# Patient Record
Sex: FEMALE | Race: WHITE | NOT HISPANIC OR LATINO | URBAN - METROPOLITAN AREA
[De-identification: names, ages, dates, MRNs, and addresses within clinical notes are randomized per-mention and may not be internally consistent; named-entity substitution may affect disease eponyms.]

---

## 2019-10-29 ENCOUNTER — OFFICE (OUTPATIENT)
Dept: URBAN - METROPOLITAN AREA CLINIC 78 | Facility: CLINIC | Age: 49
End: 2019-10-29

## 2019-10-29 VITALS
TEMPERATURE: 97.7 F | HEIGHT: 63 IN | DIASTOLIC BLOOD PRESSURE: 83 MMHG | SYSTOLIC BLOOD PRESSURE: 116 MMHG | WEIGHT: 252 LBS | HEART RATE: 74 BPM

## 2019-10-29 DIAGNOSIS — R13.10 DYSPHAGIA, UNSPECIFIED: ICD-10-CM

## 2019-10-29 DIAGNOSIS — R12 HEARTBURN: ICD-10-CM

## 2019-10-29 PROCEDURE — 99244 OFF/OP CNSLTJ NEW/EST MOD 40: CPT

## 2019-10-29 NOTE — SERVICEHPINOTES
HENRY AC   is a   49  female who presents with dysphagia. Has been experiencing food getting stuck at upper esophagus over 6 months. Reports a hx of heartburn over 2 years. It can go down its own or has to bring it up. Takes TUMS as needed for heartburn. Drinks liquids okay. BR+ Nausea, usually after eating. Denies vomiting. Had a cholecystectomy in 2018 with a hx of gallstones. This year had a mesh placement due to incisional hernia. BRTakes Aleve or ibuprofen a couple of times a week for HA. BRMoves bowel daily. Denies blood in stool, melena, constipation, diarrhea or lower abdominal pain. Denies family history of colon cancer. BRDenies chest pain, palpitations or sob. BR BR

## 2019-11-11 ENCOUNTER — OFFICE (OUTPATIENT)
Dept: URBAN - METROPOLITAN AREA CLINIC 100 | Facility: CLINIC | Age: 49
End: 2019-11-11
Payer: COMMERCIAL

## 2019-11-11 ENCOUNTER — OFFICE (OUTPATIENT)
Dept: URBAN - METROPOLITAN AREA PATHOLOGY 17 | Facility: PATHOLOGY | Age: 49
End: 2019-11-11

## 2019-11-11 VITALS
SYSTOLIC BLOOD PRESSURE: 99 MMHG | SYSTOLIC BLOOD PRESSURE: 101 MMHG | SYSTOLIC BLOOD PRESSURE: 97 MMHG | DIASTOLIC BLOOD PRESSURE: 54 MMHG | SYSTOLIC BLOOD PRESSURE: 106 MMHG | DIASTOLIC BLOOD PRESSURE: 47 MMHG | OXYGEN SATURATION: 97 % | RESPIRATION RATE: 14 BRPM | DIASTOLIC BLOOD PRESSURE: 65 MMHG | SYSTOLIC BLOOD PRESSURE: 90 MMHG | HEART RATE: 101 BPM | TEMPERATURE: 97.5 F | RESPIRATION RATE: 15 BRPM | HEART RATE: 58 BPM | OXYGEN SATURATION: 95 % | OXYGEN SATURATION: 100 % | HEART RATE: 79 BPM | RESPIRATION RATE: 12 BRPM | DIASTOLIC BLOOD PRESSURE: 61 MMHG | DIASTOLIC BLOOD PRESSURE: 63 MMHG | SYSTOLIC BLOOD PRESSURE: 109 MMHG | HEART RATE: 76 BPM | HEART RATE: 83 BPM | OXYGEN SATURATION: 98 % | WEIGHT: 252 LBS | HEIGHT: 63 IN | HEART RATE: 88 BPM | OXYGEN SATURATION: 99 %

## 2019-11-11 DIAGNOSIS — R13.10 DYSPHAGIA, UNSPECIFIED: ICD-10-CM

## 2019-11-11 DIAGNOSIS — K22.2 ESOPHAGEAL OBSTRUCTION: ICD-10-CM

## 2019-11-11 DIAGNOSIS — K20.0 EOSINOPHILIC ESOPHAGITIS: ICD-10-CM

## 2019-11-11 DIAGNOSIS — K29.60 OTHER GASTRITIS WITHOUT BLEEDING: ICD-10-CM

## 2019-11-11 DIAGNOSIS — R12 HEARTBURN: ICD-10-CM

## 2019-11-11 PROBLEM — K31.89 OTHER DISEASES OF STOMACH AND DUODENUM: Status: ACTIVE | Noted: 2019-11-11

## 2019-11-11 PROBLEM — K44.9 DIAPHRAGMATIC HERNIA WITHOUT OBSTRUCTION OR GANGRENE: Status: ACTIVE | Noted: 2019-11-11

## 2019-11-11 PROCEDURE — 43450 DILATE ESOPHAGUS 1/MULT PASS: CPT

## 2019-11-11 PROCEDURE — 43239 EGD BIOPSY SINGLE/MULTIPLE: CPT

## 2019-11-11 PROCEDURE — 88313 SPECIAL STAINS GROUP 2: CPT

## 2019-11-11 PROCEDURE — 88305 TISSUE EXAM BY PATHOLOGIST: CPT

## 2019-11-11 PROCEDURE — 88342 IMHCHEM/IMCYTCHM 1ST ANTB: CPT

## 2019-11-11 PROCEDURE — 88312 SPECIAL STAINS GROUP 1: CPT

## 2019-11-11 RX ORDER — PANTOPRAZOLE SODIUM 40 MG/1
TABLET, DELAYED RELEASE ORAL
Qty: 30 | Refills: 5 | Status: ACTIVE
Start: 2019-11-11

## 2019-12-17 ENCOUNTER — OFFICE (OUTPATIENT)
Dept: URBAN - METROPOLITAN AREA CLINIC 78 | Facility: CLINIC | Age: 49
End: 2019-12-17

## 2019-12-17 VITALS
WEIGHT: 254 LBS | SYSTOLIC BLOOD PRESSURE: 120 MMHG | HEART RATE: 77 BPM | HEIGHT: 63 IN | DIASTOLIC BLOOD PRESSURE: 73 MMHG | TEMPERATURE: 97.7 F

## 2019-12-17 DIAGNOSIS — R12 HEARTBURN: ICD-10-CM

## 2019-12-17 DIAGNOSIS — K20.0 EOSINOPHILIC ESOPHAGITIS: ICD-10-CM

## 2019-12-17 DIAGNOSIS — K62.5 HEMORRHAGE OF ANUS AND RECTUM: ICD-10-CM

## 2019-12-17 PROCEDURE — 00031: CPT

## 2019-12-17 PROCEDURE — 99214 OFFICE O/P EST MOD 30 MIN: CPT

## 2019-12-17 RX ORDER — PANTOPRAZOLE SODIUM 40 MG/1
TABLET, DELAYED RELEASE ORAL
Qty: 0 | Refills: 0 | Status: ACTIVE

## 2019-12-17 NOTE — SERVICEHPINOTES
HENRY AC   is a   49  female who presets for follow up. EGD was done on 11/11/19 with indication of dysphagia and the findings were consistent with EoE and mild erosive gastritis without h pylori. BRPt has been taking pantoprazole 40 mg once daily. The plan is to repeat EGD in 12 weeks.BRAcid reflux is controlled with pantoprazole 40 mg once daily.  BRDysphagia is not as bad but sill has some trouble swallowing with solids and liquids.BRReports having sensitivity to apples, turkey, tuna and bananas.  It causes sever heartburn.Occasional rectal bleeding after BM on wipe and mixed in with stool, last episode occurred yesterday. BRReports a hx of hemorrhoids. Denies rectal pain, burning and discharge. BRMoves bowel daily. Denies constipation, diarrhea or lower abdominal pain. BRNever had a colonoscopy in the past. BRDenies family hx of colon cancer. BR

## 2020-02-12 ENCOUNTER — OFFICE (OUTPATIENT)
Dept: URBAN - METROPOLITAN AREA PATHOLOGY 17 | Facility: PATHOLOGY | Age: 50
End: 2020-02-12

## 2020-02-12 ENCOUNTER — OFFICE (OUTPATIENT)
Dept: URBAN - METROPOLITAN AREA CLINIC 100 | Facility: CLINIC | Age: 50
End: 2020-02-12
Payer: COMMERCIAL

## 2020-02-12 VITALS
HEART RATE: 83 BPM | SYSTOLIC BLOOD PRESSURE: 101 MMHG | HEIGHT: 63 IN | OXYGEN SATURATION: 100 % | TEMPERATURE: 97.7 F | SYSTOLIC BLOOD PRESSURE: 100 MMHG | RESPIRATION RATE: 26 BRPM | DIASTOLIC BLOOD PRESSURE: 65 MMHG | DIASTOLIC BLOOD PRESSURE: 78 MMHG | WEIGHT: 239 LBS | HEART RATE: 96 BPM | DIASTOLIC BLOOD PRESSURE: 101 MMHG | HEART RATE: 95 BPM | SYSTOLIC BLOOD PRESSURE: 120 MMHG | HEART RATE: 79 BPM | SYSTOLIC BLOOD PRESSURE: 102 MMHG | SYSTOLIC BLOOD PRESSURE: 105 MMHG | RESPIRATION RATE: 18 BRPM | SYSTOLIC BLOOD PRESSURE: 99 MMHG | SYSTOLIC BLOOD PRESSURE: 103 MMHG | DIASTOLIC BLOOD PRESSURE: 73 MMHG | DIASTOLIC BLOOD PRESSURE: 87 MMHG | OXYGEN SATURATION: 94 % | HEART RATE: 91 BPM | OXYGEN SATURATION: 99 % | DIASTOLIC BLOOD PRESSURE: 64 MMHG | SYSTOLIC BLOOD PRESSURE: 112 MMHG | HEART RATE: 103 BPM | RESPIRATION RATE: 12 BRPM | RESPIRATION RATE: 16 BRPM | DIASTOLIC BLOOD PRESSURE: 68 MMHG | OXYGEN SATURATION: 97 % | SYSTOLIC BLOOD PRESSURE: 134 MMHG | HEART RATE: 93 BPM | TEMPERATURE: 97.5 F

## 2020-02-12 DIAGNOSIS — K62.1 RECTAL POLYP: ICD-10-CM

## 2020-02-12 DIAGNOSIS — R13.10 DYSPHAGIA, UNSPECIFIED: ICD-10-CM

## 2020-02-12 DIAGNOSIS — R12 HEARTBURN: ICD-10-CM

## 2020-02-12 DIAGNOSIS — K20.0 EOSINOPHILIC ESOPHAGITIS: ICD-10-CM

## 2020-02-12 DIAGNOSIS — K31.89 OTHER DISEASES OF STOMACH AND DUODENUM: ICD-10-CM

## 2020-02-12 DIAGNOSIS — K62.5 HEMORRHAGE OF ANUS AND RECTUM: ICD-10-CM

## 2020-02-12 PROBLEM — D12.0 BENIGN NEOPLASM OF CECUM: Status: ACTIVE | Noted: 2020-02-12

## 2020-02-12 PROCEDURE — 88305 TISSUE EXAM BY PATHOLOGIST: CPT

## 2020-02-12 PROCEDURE — 45380 COLONOSCOPY AND BIOPSY: CPT

## 2020-02-12 PROCEDURE — 88312 SPECIAL STAINS GROUP 1: CPT

## 2020-02-12 PROCEDURE — 43239 EGD BIOPSY SINGLE/MULTIPLE: CPT

## 2020-05-21 ENCOUNTER — OFFICE (OUTPATIENT)
Dept: URBAN - METROPOLITAN AREA CLINIC 78 | Facility: CLINIC | Age: 50
End: 2020-05-21

## 2020-05-21 PROCEDURE — 00014: CPT
